# Patient Record
Sex: MALE | Race: BLACK OR AFRICAN AMERICAN | Employment: OTHER | URBAN - METROPOLITAN AREA
[De-identification: names, ages, dates, MRNs, and addresses within clinical notes are randomized per-mention and may not be internally consistent; named-entity substitution may affect disease eponyms.]

---

## 2020-03-06 ENCOUNTER — HOSPITAL ENCOUNTER (EMERGENCY)
Age: 55
Discharge: HOME | End: 2020-03-06
Payer: MEDICAID

## 2020-03-06 VITALS
OXYGEN SATURATION: 98 % | RESPIRATION RATE: 17 BRPM | HEIGHT: 71 IN | HEART RATE: 78 BPM | BODY MASS INDEX: 22.12 KG/M2 | SYSTOLIC BLOOD PRESSURE: 133 MMHG | DIASTOLIC BLOOD PRESSURE: 80 MMHG | WEIGHT: 158 LBS | TEMPERATURE: 97.6 F

## 2020-03-06 PROCEDURE — 90471 IMMUNIZATION ADMIN: CPT | Performed by: PHYSICIAN ASSISTANT

## 2020-03-06 PROCEDURE — 6360000002 HC RX W HCPCS: Performed by: PHYSICIAN ASSISTANT

## 2020-03-06 PROCEDURE — 90715 TDAP VACCINE 7 YRS/> IM: CPT | Performed by: PHYSICIAN ASSISTANT

## 2020-03-06 PROCEDURE — 99282 EMERGENCY DEPT VISIT SF MDM: CPT

## 2020-03-06 PROCEDURE — 6370000000 HC RX 637 (ALT 250 FOR IP): Performed by: PHYSICIAN ASSISTANT

## 2020-03-06 RX ORDER — BACITRACIN ZINC, NEOMYCIN SULFATE, AND POLYMYXIN B SULFATE 400; 3.5; 5 [IU]/G; MG/G; [IU]/G
OINTMENT TOPICAL
Qty: 1 TUBE | Refills: 0 | Status: SHIPPED | OUTPATIENT
Start: 2020-03-06

## 2020-03-06 RX ADMIN — BACITRACIN ZINC 1 G: 500 OINTMENT TOPICAL at 09:05

## 2020-03-06 RX ADMIN — TETANUS TOXOID, REDUCED DIPHTHERIA TOXOID AND ACELLULAR PERTUSSIS VACCINE, ADSORBED 0.5 ML: 5; 2.5; 8; 8; 2.5 SUSPENSION INTRAMUSCULAR at 09:20

## 2020-03-06 ASSESSMENT — PAIN SCALES - GENERAL: PAINLEVEL_OUTOF10: 9

## 2020-03-06 ASSESSMENT — PAIN DESCRIPTION - PAIN TYPE: TYPE: ACUTE PAIN

## 2020-03-06 ASSESSMENT — PAIN DESCRIPTION - LOCATION: LOCATION: TOE (COMMENT WHICH ONE)

## 2020-03-06 NOTE — ED PROVIDER NOTES
ibuprofen (ADVIL;MOTRIN) 800 MG tablet Take 1 tablet by mouth every 6 hours as needed for Pain 30 tablet 0    acetaminophen (APAP EXTRA STRENGTH) 500 MG tablet Take 1 tablet by mouth every 6 hours as needed for Pain 40 tablet 0       ALLERGIES    Allergies   Allergen Reactions    Ibuprofen Other (See Comments)    Naprosyn [Naproxen]        SOCIAL & FAMILY HISTORY    Social History     Socioeconomic History    Marital status: Single     Spouse name: None    Number of children: None    Years of education: None    Highest education level: None   Occupational History    None   Social Needs    Financial resource strain: None    Food insecurity:     Worry: None     Inability: None    Transportation needs:     Medical: None     Non-medical: None   Tobacco Use    Smoking status: Current Every Day Smoker     Packs/day: 0.25     Types: Cigarettes    Smokeless tobacco: Never Used   Substance and Sexual Activity    Alcohol use: No    Drug use: No    Sexual activity: None   Lifestyle    Physical activity:     Days per week: None     Minutes per session: None    Stress: None   Relationships    Social connections:     Talks on phone: None     Gets together: None     Attends [employer] service: None     Active member of club or organization: None     Attends meetings of clubs or organizations: None     Relationship status: None    Intimate partner violence:     Fear of current or ex partner: None     Emotionally abused: None     Physically abused: None     Forced sexual activity: None   Other Topics Concern    None   Social History Narrative    None     History reviewed. No pertinent family history.     PHYSICAL EXAM    VITAL SIGNS: /80   Pulse 78   Temp 97.6 °F (36.4 °C) (Oral)   Resp 17   Ht 5' 11\" (1.803 m)   Wt 158 lb (71.7 kg)   SpO2 98%   BMI 22.04 kg/m²   Constitutional:  Well developed, well nourished  HENT:  Atraumatic, no facial or lip swelling  Neck/Lymphatics: supple, no JVD, no swollen nodes  Respiratory:  Nonlabored breathing. Lungs clear. Cardiovascular: Normal rate and rhythm  Musculoskeletal:  No edema, no acute deformities. Right foot with callus-like growth along medial aspect of second toe. There is no induration or fluctuance. No bony tenderness. Distal sensation, capillary refill intact. Remainder of foot is nontender. Right lower leg right lateral mid tibia-fibula region there is an abrasion with mild surrounding erythema. No induration. No palpable fluctuance. No tenderness to palpation. No bony tenderness. No lymphatic streaking. Integument: Right foot with callus-like growth along medial aspect of second toe. There is no induration or fluctuance. No bony tenderness. Distal sensation, capillary refill intact. Remainder of foot is nontender. Right lower leg right lateral mid tibia-fibula region there is an abrasion with mild surrounding erythema. No induration. No palpable fluctuance. No tenderness to palpation. No bony tenderness. No lymphatic streaking. ED COURSE & MEDICAL DECISION MAKING      Patient presents as above. Abrasion to right lower leg does appear mildly infected without significant induration or fluctuance. Wound is cleaned, bacitracin ointment applied and bandage. Patient updated on tetanus. Patient does have chronic appearing callus formation to right second toe. Patient will be provided information for local podiatry. Patient declines x-rays. Recommend follow-up with primary care provider in 2 to 3 days for recheck of right leg wound. Clinical  IMPRESSION    Abrasion  Corns and callus      Diagnosis and plan discussed in detail with patient who understands and agrees.   Return to emergency Department warning signs discussed in detail with patient today who understands and agrees including but not limited to worsening rash,fever, mouth/tongue/lip swelling, trouble breathing or swallowing, or any new symptoms not present today.      Comment: Please note this report has been produced using speech recognition software and may contain errors related to that system including errors in grammar, punctuation, and spelling, as well as words and phrases that may be inappropriate. If there are any questions or concerns please feel free to contact the dictating provider for clarification.              Roxie Julien PA-C  03/06/20 [id number]

## 2020-03-06 NOTE — ED NOTES
Discharge instructions reviewed with patient. Medication and follow up were discussed.  Patient denies further questions and verbalizes understanding     Kelli RN  03/06/20 [id number]